# Patient Record
Sex: MALE | Race: WHITE | NOT HISPANIC OR LATINO | Employment: UNEMPLOYED | URBAN - METROPOLITAN AREA
[De-identification: names, ages, dates, MRNs, and addresses within clinical notes are randomized per-mention and may not be internally consistent; named-entity substitution may affect disease eponyms.]

---

## 2024-04-15 ENCOUNTER — HOSPITAL ENCOUNTER (OUTPATIENT)
Facility: HOSPITAL | Age: 69
Discharge: HOME | End: 2024-04-16
Attending: EMERGENCY MEDICINE | Admitting: INTERNAL MEDICINE
Payer: MEDICARE

## 2024-04-15 DIAGNOSIS — R07.9 CHEST PAIN: ICD-10-CM

## 2024-04-15 DIAGNOSIS — N17.9 AKI (ACUTE KIDNEY INJURY): ICD-10-CM

## 2024-04-15 DIAGNOSIS — R33.9 URINARY RETENTION: Primary | ICD-10-CM

## 2024-04-15 DIAGNOSIS — Z90.5 H/O RIGHT NEPHRECTOMY: ICD-10-CM

## 2024-04-15 PROBLEM — I10 ESSENTIAL HYPERTENSION: Status: ACTIVE | Noted: 2024-04-15

## 2024-04-15 PROBLEM — R73.9 HYPERGLYCEMIA: Status: ACTIVE | Noted: 2024-04-15

## 2024-04-15 LAB
ALBUMIN SERPL BCP-MCNC: 4.3 G/DL (ref 3.5–5.2)
ALP SERPL-CCNC: 142 U/L (ref 55–135)
ALT SERPL W/O P-5'-P-CCNC: 13 U/L (ref 10–44)
ANION GAP SERPL CALC-SCNC: 8 MMOL/L (ref 8–16)
AST SERPL-CCNC: 11 U/L (ref 10–40)
BACTERIA #/AREA URNS HPF: ABNORMAL /HPF
BASOPHILS # BLD AUTO: 0.03 K/UL (ref 0–0.2)
BASOPHILS NFR BLD: 0.3 % (ref 0–1.9)
BILIRUB SERPL-MCNC: 0.5 MG/DL (ref 0.1–1)
BILIRUB UR QL STRIP: NEGATIVE
BUN SERPL-MCNC: 23 MG/DL (ref 8–23)
CALCIUM SERPL-MCNC: 9.9 MG/DL (ref 8.7–10.5)
CHLORIDE SERPL-SCNC: 104 MMOL/L (ref 95–110)
CLARITY UR: CLEAR
CO2 SERPL-SCNC: 25 MMOL/L (ref 23–29)
COLOR UR: YELLOW
CREAT SERPL-MCNC: 2.4 MG/DL (ref 0.5–1.4)
DIFFERENTIAL METHOD BLD: ABNORMAL
EOSINOPHIL # BLD AUTO: 0 K/UL (ref 0–0.5)
EOSINOPHIL NFR BLD: 0.2 % (ref 0–8)
ERYTHROCYTE [DISTWIDTH] IN BLOOD BY AUTOMATED COUNT: 13.5 % (ref 11.5–14.5)
EST. GFR  (NO RACE VARIABLE): 28.1 ML/MIN/1.73 M^2
GLUCOSE SERPL-MCNC: 242 MG/DL (ref 70–110)
GLUCOSE SERPL-MCNC: 324 MG/DL (ref 70–110)
GLUCOSE UR QL STRIP: ABNORMAL
HCT VFR BLD AUTO: 46.8 % (ref 40–54)
HGB BLD-MCNC: 15.2 G/DL (ref 14–18)
HGB UR QL STRIP: ABNORMAL
IMM GRANULOCYTES # BLD AUTO: 0.03 K/UL (ref 0–0.04)
IMM GRANULOCYTES NFR BLD AUTO: 0.3 % (ref 0–0.5)
KETONES UR QL STRIP: NEGATIVE
LEUKOCYTE ESTERASE UR QL STRIP: NEGATIVE
LIPASE SERPL-CCNC: 18 U/L (ref 4–60)
LYMPHOCYTES # BLD AUTO: 0.8 K/UL (ref 1–4.8)
LYMPHOCYTES NFR BLD: 8.6 % (ref 18–48)
MCH RBC QN AUTO: 27.4 PG (ref 27–31)
MCHC RBC AUTO-ENTMCNC: 32.5 G/DL (ref 32–36)
MCV RBC AUTO: 84 FL (ref 82–98)
MICROSCOPIC COMMENT: ABNORMAL
MONOCYTES # BLD AUTO: 0.6 K/UL (ref 0.3–1)
MONOCYTES NFR BLD: 6.2 % (ref 4–15)
NEUTROPHILS # BLD AUTO: 8 K/UL (ref 1.8–7.7)
NEUTROPHILS NFR BLD: 84.4 % (ref 38–73)
NITRITE UR QL STRIP: NEGATIVE
NON-SQ EPI CELLS #/AREA URNS HPF: 3 /HPF
NRBC BLD-RTO: 0 /100 WBC
PH UR STRIP: 6 [PH] (ref 5–8)
PLATELET # BLD AUTO: 211 K/UL (ref 150–450)
PMV BLD AUTO: 10.3 FL (ref 9.2–12.9)
POTASSIUM SERPL-SCNC: 5 MMOL/L (ref 3.5–5.1)
PROT SERPL-MCNC: 7.8 G/DL (ref 6–8.4)
PROT UR QL STRIP: NEGATIVE
RBC # BLD AUTO: 5.55 M/UL (ref 4.6–6.2)
RBC #/AREA URNS HPF: 50 /HPF (ref 0–4)
SODIUM SERPL-SCNC: 137 MMOL/L (ref 136–145)
SP GR UR STRIP: 1.02 (ref 1–1.03)
URN SPEC COLLECT METH UR: ABNORMAL
UROBILINOGEN UR STRIP-ACNC: NEGATIVE EU/DL
WBC # BLD AUTO: 9.43 K/UL (ref 3.9–12.7)
YEAST URNS QL MICRO: ABNORMAL

## 2024-04-15 PROCEDURE — 81001 URINALYSIS AUTO W/SCOPE: CPT | Performed by: EMERGENCY MEDICINE

## 2024-04-15 PROCEDURE — G0378 HOSPITAL OBSERVATION PER HR: HCPCS

## 2024-04-15 PROCEDURE — 96360 HYDRATION IV INFUSION INIT: CPT | Mod: 59

## 2024-04-15 PROCEDURE — 96361 HYDRATE IV INFUSION ADD-ON: CPT | Mod: 59

## 2024-04-15 PROCEDURE — 25000003 PHARM REV CODE 250: Performed by: NURSE PRACTITIONER

## 2024-04-15 PROCEDURE — 83690 ASSAY OF LIPASE: CPT | Performed by: EMERGENCY MEDICINE

## 2024-04-15 PROCEDURE — 96361 HYDRATE IV INFUSION ADD-ON: CPT

## 2024-04-15 PROCEDURE — 51702 INSERT TEMP BLADDER CATH: CPT

## 2024-04-15 PROCEDURE — 82962 GLUCOSE BLOOD TEST: CPT

## 2024-04-15 PROCEDURE — 51798 US URINE CAPACITY MEASURE: CPT | Mod: 59

## 2024-04-15 PROCEDURE — 80053 COMPREHEN METABOLIC PANEL: CPT | Performed by: EMERGENCY MEDICINE

## 2024-04-15 PROCEDURE — 85025 COMPLETE CBC W/AUTO DIFF WBC: CPT | Performed by: EMERGENCY MEDICINE

## 2024-04-15 PROCEDURE — 99285 EMERGENCY DEPT VISIT HI MDM: CPT | Mod: 25

## 2024-04-15 RX ORDER — GLUCAGON 1 MG
1 KIT INJECTION
Status: DISCONTINUED | OUTPATIENT
Start: 2024-04-15 | End: 2024-04-15

## 2024-04-15 RX ORDER — ACETAMINOPHEN 325 MG/1
650 TABLET ORAL EVERY 4 HOURS PRN
Status: DISCONTINUED | OUTPATIENT
Start: 2024-04-15 | End: 2024-04-16 | Stop reason: HOSPADM

## 2024-04-15 RX ORDER — IBUPROFEN 200 MG
16 TABLET ORAL
Status: DISCONTINUED | OUTPATIENT
Start: 2024-04-15 | End: 2024-04-15

## 2024-04-15 RX ORDER — SODIUM CHLORIDE 9 MG/ML
INJECTION, SOLUTION INTRAVENOUS CONTINUOUS
Status: DISCONTINUED | OUTPATIENT
Start: 2024-04-15 | End: 2024-04-16 | Stop reason: HOSPADM

## 2024-04-15 RX ORDER — TAMSULOSIN HYDROCHLORIDE 0.4 MG/1
0.4 CAPSULE ORAL DAILY
Status: DISCONTINUED | OUTPATIENT
Start: 2024-04-15 | End: 2024-04-16 | Stop reason: HOSPADM

## 2024-04-15 RX ORDER — TALC
6 POWDER (GRAM) TOPICAL NIGHTLY PRN
Status: DISCONTINUED | OUTPATIENT
Start: 2024-04-15 | End: 2024-04-16 | Stop reason: HOSPADM

## 2024-04-15 RX ORDER — SODIUM,POTASSIUM PHOSPHATES 280-250MG
2 POWDER IN PACKET (EA) ORAL
Status: DISCONTINUED | OUTPATIENT
Start: 2024-04-15 | End: 2024-04-16 | Stop reason: HOSPADM

## 2024-04-15 RX ORDER — ALUMINUM HYDROXIDE, MAGNESIUM HYDROXIDE, AND SIMETHICONE 1200; 120; 1200 MG/30ML; MG/30ML; MG/30ML
30 SUSPENSION ORAL 4 TIMES DAILY PRN
Status: DISCONTINUED | OUTPATIENT
Start: 2024-04-15 | End: 2024-04-16 | Stop reason: HOSPADM

## 2024-04-15 RX ORDER — NALOXONE HCL 0.4 MG/ML
0.02 VIAL (ML) INJECTION
Status: DISCONTINUED | OUTPATIENT
Start: 2024-04-15 | End: 2024-04-16 | Stop reason: HOSPADM

## 2024-04-15 RX ORDER — LISINOPRIL 20 MG/1
20 TABLET ORAL DAILY
COMMUNITY

## 2024-04-15 RX ORDER — ACETAMINOPHEN 500 MG
500 TABLET ORAL EVERY 6 HOURS PRN
COMMUNITY

## 2024-04-15 RX ORDER — ONDANSETRON HYDROCHLORIDE 2 MG/ML
4 INJECTION, SOLUTION INTRAVENOUS EVERY 6 HOURS PRN
Status: DISCONTINUED | OUTPATIENT
Start: 2024-04-15 | End: 2024-04-16 | Stop reason: HOSPADM

## 2024-04-15 RX ORDER — AMOXICILLIN 250 MG
1 CAPSULE ORAL DAILY
Status: DISCONTINUED | OUTPATIENT
Start: 2024-04-16 | End: 2024-04-16 | Stop reason: HOSPADM

## 2024-04-15 RX ORDER — ACETAMINOPHEN 325 MG/1
650 TABLET ORAL EVERY 8 HOURS PRN
Status: DISCONTINUED | OUTPATIENT
Start: 2024-04-15 | End: 2024-04-16 | Stop reason: HOSPADM

## 2024-04-15 RX ORDER — INSULIN ASPART 100 [IU]/ML
0-5 INJECTION, SOLUTION INTRAVENOUS; SUBCUTANEOUS
Status: DISCONTINUED | OUTPATIENT
Start: 2024-04-15 | End: 2024-04-15

## 2024-04-15 RX ORDER — LISINOPRIL 20 MG/1
20 TABLET ORAL DAILY
Status: CANCELLED | OUTPATIENT
Start: 2024-04-16

## 2024-04-15 RX ORDER — LANOLIN ALCOHOL/MO/W.PET/CERES
800 CREAM (GRAM) TOPICAL
Status: DISCONTINUED | OUTPATIENT
Start: 2024-04-15 | End: 2024-04-16 | Stop reason: HOSPADM

## 2024-04-15 RX ORDER — HYDROCODONE BITARTRATE AND ACETAMINOPHEN 5; 325 MG/1; MG/1
1 TABLET ORAL EVERY 6 HOURS PRN
Status: DISCONTINUED | OUTPATIENT
Start: 2024-04-15 | End: 2024-04-16 | Stop reason: HOSPADM

## 2024-04-15 RX ORDER — IBUPROFEN 200 MG
24 TABLET ORAL
Status: DISCONTINUED | OUTPATIENT
Start: 2024-04-15 | End: 2024-04-15

## 2024-04-15 RX ORDER — SODIUM CHLORIDE 0.9 % (FLUSH) 0.9 %
2 SYRINGE (ML) INJECTION EVERY 12 HOURS PRN
Status: DISCONTINUED | OUTPATIENT
Start: 2024-04-15 | End: 2024-04-16 | Stop reason: HOSPADM

## 2024-04-15 RX ORDER — PANTOPRAZOLE SODIUM 40 MG/1
40 TABLET, DELAYED RELEASE ORAL DAILY
Status: DISCONTINUED | OUTPATIENT
Start: 2024-04-16 | End: 2024-04-16 | Stop reason: HOSPADM

## 2024-04-15 RX ADMIN — SODIUM CHLORIDE: 9 INJECTION, SOLUTION INTRAVENOUS at 04:04

## 2024-04-15 RX ADMIN — TAMSULOSIN HYDROCHLORIDE 0.4 MG: 0.4 CAPSULE ORAL at 04:04

## 2024-04-15 NOTE — ED NOTES
Bed: [place] [address]  Expected date:   Expected time:   Means of arrival:   Comments:  CC EXAM

## 2024-04-15 NOTE — ASSESSMENT & PLAN NOTE
"Patient's FSGs are uncontrolled due to hyperglycemia on current medication regimen.  Last A1c reviewed- No results found for: "LABA1C", "HGBA1C"  Most recent fingerstick glucose reviewed- No results for input(s): "POCTGLUCOSE" in the last 24 hours.  Current correctional scale  Low  Maintain anti-hyperglycemic dose as follows-   Antihyperglycemics (From admission, onward)      Start     Stop Route Frequency Ordered    04/15/24 1630  insulin aspart U-100 pen 0-5 Units         -- SubQ Before meals & nightly PRN 04/15/24 1531          Work up for new onset diabetes.   Accu checks, SSI, check Hgb A1c  Diabetic education      "

## 2024-04-15 NOTE — ED PROVIDER NOTES
Encounter Date: 4/15/2024       History     Chief Complaint   Patient presents with    Urinary Retention     Since last night     69-year-old male presents to the emergency department secondary to urinary retention that started a proximally 7 p.m..  Patient has a past medical history of right nephrectomy secondary to renal cell carcinoma, hypertension.  Denies history of prostate problems, has had no recent fevers, or dysuria.  Patient currently has no complaints        Review of patient's allergies indicates:   Allergen Reactions    Mercurial analogues Rash     Past Medical History:   Diagnosis Date    Cancer     Chronic kidney disease     Hypertension      Past Surgical History:   Procedure Laterality Date    CHOLECYSTECTOMY      NEPHRECTOMY      right     Family History   Problem Relation Name Age of Onset    Cancer Mother      Diabetes Mother      Diabetes Father       Social History     Tobacco Use    Smoking status: Every Day     Types: Cigarettes     Review of Systems   Constitutional:  Negative for fever.   HENT: Negative.     Respiratory: Negative.     Cardiovascular: Negative.    Genitourinary:  Positive for difficulty urinating. Negative for urgency.   Musculoskeletal: Negative.    Hematological: Negative.    Psychiatric/Behavioral: Negative.     All other systems reviewed and are negative.      Physical Exam     Initial Vitals [04/15/24 1026]   BP Pulse Resp Temp SpO2   (!) 187/102 100 18 98 °F (36.7 °C) 97 %      MAP       --         Physical Exam    Nursing note and vitals reviewed.  Constitutional: He appears well-developed and well-nourished.   HENT:   Head: Normocephalic.   Eyes: EOM are normal. Pupils are equal, round, and reactive to light.   Neck: Neck supple.   Normal range of motion.  Cardiovascular:  Normal rate and regular rhythm.           Pulmonary/Chest: Breath sounds normal. No respiratory distress.   Abdominal: Abdomen is soft. Bowel sounds are normal.   Musculoskeletal:      Cervical  back: Normal range of motion and neck supple.     Neurological: He is alert. No sensory deficit. GCS score is 15. GCS eye subscore is 4. GCS verbal subscore is 5. GCS motor subscore is 6.   Skin: Skin is warm and dry.   Psychiatric: He has a normal mood and affect.         ED Course   Procedures  Labs Reviewed   CBC W/ AUTO DIFFERENTIAL - Abnormal; Notable for the following components:       Result Value    Gran # (ANC) 8.0 (*)     Lymph # 0.8 (*)     Gran % 84.4 (*)     Lymph % 8.6 (*)     All other components within normal limits   COMPREHENSIVE METABOLIC PANEL - Abnormal; Notable for the following components:    Glucose 324 (*)     Creatinine 2.4 (*)     Alkaline Phosphatase 142 (*)     eGFR 28.1 (*)     All other components within normal limits   URINALYSIS, REFLEX TO URINE CULTURE - Abnormal; Notable for the following components:    Glucose, UA 4+ (*)     Occult Blood UA 1+ (*)     All other components within normal limits    Narrative:     Specimen Source->Urine   URINALYSIS MICROSCOPIC - Abnormal; Notable for the following components:    RBC, UA 50 (*)     Non-Squam Epith 3 (*)     All other components within normal limits    Narrative:     Specimen Source->Urine   LIPASE          Imaging Results              US Retroperitoneal Complete (Final result)  Result time 04/15/24 14:15:46      Final result by Ryan Ly MD (04/15/24 14:15:46)                   Impression:      1. No findings of medical or surgical renal disease on the left.  2. Simple left renal cyst.  3. Previous right nephrectomy.      Electronically signed by: Ryan Ly  Date:    04/15/2024  Time:    14:15               Narrative:    EXAMINATION:  US RETROPERITONEAL COMPLETE    CLINICAL HISTORY:  flank pain h/o prev nephrectomy;    FINDINGS:  Comparison to prior ultrasound of 07/11/2013. The right kidney is surgically absent, with no mass or fluid collection in the right renal fossa.    The left kidney measures 12.7 cm in length and has  normal cortical thickness and parenchymal echotexture, without echogenic calculi or hydronephrosis.  A 38 mm oval circumscribed anechoic simple cyst arising from the midportion has increased through transmission of sound.    The urinary bladder is collapsed and not well evaluated.  The visualized abdominal aorta, aortic bifurcation, common iliac artery origins, and IVC are normal.                                       Medications - No data to display  Medical Decision Making  69-year-old male presents to the emergency department secondary to urinary retention that started a proximally 7 p.m..  Patient has a past medical history of right nephrectomy secondary to renal cell carcinoma, hypertension.  Denies history of prostate problems, has had no recent fevers, or dysuria.  Patient currently has no complaints      Considerations include but not limited to UTI, urinary retention, electrolyte abnormalities, renal abnormalities    69-year-old male presents to the emergency department secondary to urinary tension that started a proximally 7 p.m. last night patient denies any previous medical history although he has had a previous right nephrectomy secondary to renal cell carcinoma he states that he has had no issues he is followed by Dr. Jensen.  Denies any back pain fever nausea.  Patient has no evidence of leukocytosis, no evidence of infection on urinalysis.  He does have JAYLON with creatinine 2.4 GFR 24 I am unable to see if this is new or not because I do not have any old labs in the patient's chart.  Upon arrival bladder scan revealed greater than 900 cc of urine a catheter was placed with a 1000 cc of clear urine.  Retroperitoneal ultrasound performed no significant abnormalities noted to left kidney  Patient will be admitted secondary to medical history with JAYLON.    Amount and/or Complexity of Data Reviewed  Independent Historian: parent and spouse  External Data Reviewed: labs, radiology and notes.  Labs:  ordered. Decision-making details documented in ED Course.  Radiology: ordered.    Risk  Decision regarding hospitalization.              Attending Attestation:     Physician Attestation Statement for NP/PA:       Other NP/PA Attestation Additions:    History of Present Illness: I performed face-to-face evaluation due to medical complexity.  Patient reports inability to urinate.  History of nephrectomy.   Physical Exam: Patient is alert, no distress.  I did evaluated patient after Sharma catheter.   Medical Decision Making: Patient's significant other report normal renal function.  Unfortunately have no old labs for comparison.  Today creatinine is 2.4.  Patient has solitary kidney.  Patient does have urinary retention is likely etiology.  Patient to be admitted for further evaluation.  Ultrasound pending at time of dictation.  Carina with [facility] to evaluate.                                    Clinical Impression:  Final diagnoses:  [R33.9] Urinary retention (Primary)  [N17.9] JAYLON (acute kidney injury)  [Z90.5] H/O right nephrectomy          ED Disposition Condition    Admit Stable                Juany Brumfield FNP  04/15/24 1432       Ron Castellanos MD  04/15/24 6421

## 2024-04-15 NOTE — ASSESSMENT & PLAN NOTE
Patient with acute kidney injury/acute renal failure likely due to pre-renal azotemia due to dehydration JAYLON is currently  unknown due to unknown  baseline creatinine  - Labs reviewed- Renal function/electrolytes with Estimated Creatinine Clearance: 30.1 mL/min (A) (based on SCr of 2.4 mg/dL (H)). according to latest data. Monitor urine output and serial BMP and adjust therapy as needed. Avoid nephrotoxins and renally dose meds for GFR listed above.

## 2024-04-15 NOTE — HPI
69-year-old male with a past medical history of chronic kidney disease, hypertension, renal cell carcinoma status post right nephrectomy presents to the emergency room reports of urinary retention that started at 7:00 p.m. last night.  He denies prostate problems.  He denies dysuria frequency.  Denies chest pain shortness for breath fevers weakness. he is followed by Dr. Jensen.  In the ER afebrile, no leukocytosis urinalysis negative for infection.  He does have JAYLON with a serum creatinine of 2.4 GFR of 24.  No old records to review baseline GFR.  Bladder scan revealed 900 mL of urine Sharma catheter placed 1000 mL of urine removed from bladder.  Retroperitoneal ultrasound performed no significant abnormalities noted to left kidney. Admit to  hospital medicine due to medical history with JAYLON.

## 2024-04-15 NOTE — ASSESSMENT & PLAN NOTE
Continue ramon x3 to 5 days total.   The patient was was not offered the option of performing clean intermittent catheterization.   Start flomax daily at 0.4 mg  Follow up with Urology outpatient.

## 2024-04-15 NOTE — SUBJECTIVE & OBJECTIVE
Past Medical History:   Diagnosis Date    Cancer     Chronic kidney disease     Hypertension        Past Surgical History:   Procedure Laterality Date    CHOLECYSTECTOMY      NEPHRECTOMY      right       Review of patient's allergies indicates:   Allergen Reactions    Mercurial analogues Rash       Current Facility-Administered Medications   Medication Dose Route Frequency Provider Last Rate Last Admin    0.9%  NaCl infusion   Intravenous Continuous Miknaitis, Carina, NP        acetaminophen tablet 650 mg  650 mg Oral Q8H PRN Miknaitis, Carina, NP        acetaminophen tablet 650 mg  650 mg Oral Q4H PRN Miknaitis, Carina, NP        aluminum-magnesium hydroxide-simethicone 200-200-20 mg/5 mL suspension 30 mL  30 mL Oral QID PRN Miknaitis, Carina, NP        dextrose 50% injection 12.5 g  12.5 g Intravenous PRN Miknaitis, Carina, NP        dextrose 50% injection 25 g  25 g Intravenous PRN Miknaitis, Carina, NP        glucagon (human recombinant) injection 1 mg  1 mg Intramuscular PRN Miknaitis, Carina, NP        glucose chewable tablet 16 g  16 g Oral PRN Miknaitis, Carina, NP        glucose chewable tablet 24 g  24 g Oral PRN Miknaitis, Carina, NP        HYDROcodone-acetaminophen 5-325 mg per tablet 1 tablet  1 tablet Oral Q6H PRN Mikvictor mtis, Carina, NP        insulin aspart U-100 pen 0-5 Units  0-5 Units Subcutaneous QID (AC + HS) PRN Mikbj Carina, NP        melatonin tablet 6 mg  6 mg Oral Nightly PRN Rebeca Carina, NP        naloxone 0.4 mg/mL injection 0.02 mg  0.02 mg Intravenous PRN Mikvictor mtis, Carina, NP        ondansetron injection 4 mg  4 mg Intravenous Q6H PRN Mikvictor mtis Carina, NP        [START ON 4/16/2024] senna-docusate 8.6-50 mg per tablet 1 tablet  1 tablet Oral Daily Miknaitis, Carina, NP        sodium chloride 0.9% flush 2 mL  2 mL Intravenous Q12H PRN Mikvictor mtis, Carina, NP        tamsulosin 24 hr capsule 0.4 mg  0.4 mg Oral Daily Miknaitis, Carina, NP         Current Outpatient Medications   Medication Sig Dispense Refill     acetaminophen (TYLENOL EXTRA STRENGTH) 500 MG tablet Take 500 mg by mouth every 6 (six) hours as needed for Pain.      lisinopriL (PRINIVIL,ZESTRIL) 20 MG tablet Take 20 mg by mouth once daily.       Family History       Problem Relation (Age of Onset)    Cancer Mother    Diabetes Mother, Father          Tobacco Use    Smoking status: Every Day     Types: Cigarettes    Smokeless tobacco: Not on file   Substance and Sexual Activity    Alcohol use: Not on file    Drug use: Not on file    Sexual activity: Not on file     Review of Systems   Constitutional: Negative.    HENT: Negative.     Respiratory: Negative.     Cardiovascular: Negative.    Gastrointestinal: Negative.    Genitourinary:  Positive for difficulty urinating.        Unable to empty bladder.   Neurological: Negative.    Psychiatric/Behavioral: Negative.     All other systems reviewed and are negative.    Objective:     Vital Signs (Most Recent):  Temp: 98 °F (36.7 °C) (04/15/24 1026)  Pulse: 80 (04/15/24 1444)  Resp: 18 (04/15/24 1444)  BP: (!) 140/72 (04/15/24 1444)  SpO2: 99 % (04/15/24 1444) Vital Signs (24h Range):  Temp:  [98 °F (36.7 °C)] 98 °F (36.7 °C)  Pulse:  [] 80  Resp:  [18] 18  SpO2:  [97 %-99 %] 99 %  BP: (140-187)/() 140/72     Weight: 83.9 kg (185 lb)  Body mass index is 25.8 kg/m².     Physical Exam  Vitals reviewed.   Constitutional:       Appearance: Normal appearance.   HENT:      Head: Normocephalic and atraumatic.      Mouth/Throat:      Mouth: Mucous membranes are dry.   Eyes:      Extraocular Movements: Extraocular movements intact.      Pupils: Pupils are equal, round, and reactive to light.   Cardiovascular:      Rate and Rhythm: Normal rate and regular rhythm.      Pulses: Normal pulses.      Heart sounds: Normal heart sounds. No murmur heard.     No gallop.   Pulmonary:      Effort: Pulmonary effort is normal. No respiratory distress.      Breath sounds: Normal breath sounds. No wheezing.   Abdominal:      General:  There is no distension.      Palpations: Abdomen is soft.      Tenderness: There is no abdominal tenderness.   Genitourinary:     Comments: Sharma catheter placed  Musculoskeletal:         General: Normal range of motion.      Cervical back: Normal range of motion.   Skin:     General: Skin is warm and dry.      Capillary Refill: Capillary refill takes less than 2 seconds.   Neurological:      General: No focal deficit present.      Mental Status: He is alert and oriented to person, place, and time. Mental status is at baseline.      Cranial Nerves: No cranial nerve deficit.   Psychiatric:         Mood and Affect: Mood normal.              CRANIAL NERVES     CN III, IV, VI   Pupils are equal, round, and reactive to light.       Significant Labs: All pertinent labs within the past 24 hours have been reviewed.  Recent Lab Results         04/15/24  1147   04/15/24  1110        Albumin   4.3       ALP   142       ALT   13       Anion Gap   8       Appearance, UA Clear         AST   11       Bacteria, UA Rare         Baso #   0.03       Basophil %   0.3       Bilirubin (UA) Negative         BILIRUBIN TOTAL   0.5  Comment: For infants and newborns, interpretation of results should be based  on gestational age, weight and in agreement with clinical  observations.    Premature Infant recommended reference ranges:  Up to 24 hours.............<8.0 mg/dL  Up to 48 hours............<12.0 mg/dL  3-5 days..................<15.0 mg/dL  6-29 days.................<15.0 mg/dL         BUN   23       Calcium   9.9       Chloride   104       CO2   25       Color, UA Yellow         Creatinine   2.4       Differential Method   Automated       eGFR   28.1       Eos #   0.0       Eos %   0.2       Glucose   324       Glucose, UA 4+         Gran # (ANC)   8.0       Gran %   84.4       Hematocrit   46.8       Hemoglobin   15.2       Immature Grans (Abs)   0.03  Comment: Mild elevation in immature granulocytes is non specific and   can be seen  in a variety of conditions including stress response,   acute inflammation, trauma and pregnancy. Correlation with other   laboratory and clinical findings is essential.         Immature Granulocytes   0.3       Ketones, UA Negative         Leukocyte Esterase, UA Negative         Lipase   18       Lymph #   0.8       Lymph %   8.6       MCH   27.4       MCHC   32.5       MCV   84       Microscopic Comment SEE COMMENT  Comment: Other formed elements not mentioned in the report are not   present in the microscopic examination.            Mono #   0.6       Mono %   6.2       MPV   10.3       NITRITE UA Negative         NON-SQUAM EPITH 3         nRBC   0       Blood, UA 1+         pH, UA 6.0         Platelet Count   211       Potassium   5.0       PROTEIN TOTAL   7.8       Protein, UA Negative  Comment: Recommend a 24 hour urine protein or a urine   protein/creatinine ratio if globulin induced proteinuria is  clinically suspected.           RBC   5.55       RBC, UA 50         RDW   13.5       Sodium   137       Specific [place], UA 1.025         Specimen UA Urine, Clean Catch         UROBILINOGEN UA Negative         WBC   9.43       Yeast, UA None                 Significant Imaging: I have reviewed all pertinent imaging results/findings within the past 24 hours.  Imaging Results              US Retroperitoneal Complete (Final result)  Result time 04/15/24 14:15:46      Final result by Ryan Ly MD (04/15/24 14:15:46)                   Impression:      1. No findings of medical or surgical renal disease on the left.  2. Simple left renal cyst.  3. Previous right nephrectomy.      Electronically signed by: Ryan Ly  Date:    04/15/2024  Time:    14:15               Narrative:    EXAMINATION:  US RETROPERITONEAL COMPLETE    CLINICAL HISTORY:  flank pain h/o prev nephrectomy;    FINDINGS:  Comparison to prior ultrasound of 07/11/2013. The right kidney is surgically absent, with no mass or fluid collection in the  right renal fossa.    The left kidney measures 12.7 cm in length and has normal cortical thickness and parenchymal echotexture, without echogenic calculi or hydronephrosis.  A 38 mm oval circumscribed anechoic simple cyst arising from the midportion has increased through transmission of sound.    The urinary bladder is collapsed and not well evaluated.  The visualized abdominal aorta, aortic bifurcation, common iliac artery origins, and IVC are normal.

## 2024-04-15 NOTE — H&P
[facility] - Emergency Dept  Hospital Medicine  History & Physical    Patient Name: Jack Kenyon  MRN: [id number]  Patient Class: OP- Observation  Admission Date: 4/15/2024  Attending Physician: Melchor Topete MD   Primary Care Provider: Rafi Wolfe MD         Patient information was obtained from patient and ER records.     Subjective:     Principal Problem:JAYLON (acute kidney injury)    Chief Complaint:   Chief Complaint   Patient presents with    Urinary Retention     Since last night        HPI: 69-year-old male with a past medical history of chronic kidney disease, hypertension, renal cell carcinoma status post right nephrectomy presents to the emergency room reports of urinary retention that started at 7:00 p.m. last night.  He denies prostate problems.  He denies dysuria frequency.  Denies chest pain shortness for breath fevers weakness. he is followed by Dr. Jensen.  In the ER afebrile, no leukocytosis urinalysis negative for infection.  He does have JAYLON with a serum creatinine of 2.4 GFR of 24.  No old records to review baseline GFR.  Bladder scan revealed 900 mL of urine Sharma catheter placed 1000 mL of urine removed from bladder.  Retroperitoneal ultrasound performed no significant abnormalities noted to left kidney. Admit to  hospital medicine due to medical history with JAYLON.       Past Medical History:   Diagnosis Date    Cancer     Chronic kidney disease     Hypertension        Past Surgical History:   Procedure Laterality Date    CHOLECYSTECTOMY      NEPHRECTOMY      right       Review of patient's allergies indicates:   Allergen Reactions    Mercurial analogues Rash       Current Facility-Administered Medications   Medication Dose Route Frequency Provider Last Rate Last Admin    0.9%  NaCl infusion   Intravenous Continuous Carina Jose NP        acetaminophen tablet 650 mg  650 mg Oral Q8H PRN Carina Jose NP        acetaminophen tablet 650 mg  650 mg Oral Q4H PRN  Carina Jose, NP        aluminum-magnesium hydroxide-simethicone 200-200-20 mg/5 mL suspension 30 mL  30 mL Oral QID PRN Carina Jose, NP        dextrose 50% injection 12.5 g  12.5 g Intravenous PRN Carina Jose, NP        dextrose 50% injection 25 g  25 g Intravenous PRN Carina Jose, NP        glucagon (human recombinant) injection 1 mg  1 mg Intramuscular PRN Carina Jose, NP        glucose chewable tablet 16 g  16 g Oral PRN Mirella Josei, NP        glucose chewable tablet 24 g  24 g Oral PRN Mirella Josei, NP        HYDROcodone-acetaminophen 5-325 mg per tablet 1 tablet  1 tablet Oral Q6H PRN Carina Jose, MAHSA        insulin aspart U-100 pen 0-5 Units  0-5 Units Subcutaneous QID (AC + HS) PRN Carina Jose, NP        melatonin tablet 6 mg  6 mg Oral Nightly PRN Carina Jose, NP        naloxone 0.4 mg/mL injection 0.02 mg  0.02 mg Intravenous PRN Carina Jose, MAHSA        ondansetron injection 4 mg  4 mg Intravenous Q6H PRN Carina Jose, MAHSA        [START ON 4/16/2024] senna-docusate 8.6-50 mg per tablet 1 tablet  1 tablet Oral Daily Carina Jose, NP        sodium chloride 0.9% flush 2 mL  2 mL Intravenous Q12H PRN Carina Jose, MAHSA        tamsulosin 24 hr capsule 0.4 mg  0.4 mg Oral Daily Carina Jose NP         Current Outpatient Medications   Medication Sig Dispense Refill    acetaminophen (TYLENOL EXTRA STRENGTH) 500 MG tablet Take 500 mg by mouth every 6 (six) hours as needed for Pain.      lisinopriL (PRINIVIL,ZESTRIL) 20 MG tablet Take 20 mg by mouth once daily.       Family History       Problem Relation (Age of Onset)    Cancer Mother    Diabetes Mother, Father          Tobacco Use    Smoking status: Every Day     Types: Cigarettes    Smokeless tobacco: Not on file   Substance and Sexual Activity    Alcohol use: Not on file    Drug use: Not on file    Sexual activity: Not on file     Review of Systems   Constitutional: Negative.    HENT: Negative.     Respiratory:  Negative.     Cardiovascular: Negative.    Gastrointestinal: Negative.    Genitourinary:  Positive for difficulty urinating.        Unable to empty bladder.   Neurological: Negative.    Psychiatric/Behavioral: Negative.     All other systems reviewed and are negative.    Objective:     Vital Signs (Most Recent):  Temp: 98 °F (36.7 °C) (04/15/24 1026)  Pulse: 80 (04/15/24 1444)  Resp: 18 (04/15/24 1444)  BP: (!) 140/72 (04/15/24 1444)  SpO2: 99 % (04/15/24 1444) Vital Signs (24h Range):  Temp:  [98 °F (36.7 °C)] 98 °F (36.7 °C)  Pulse:  [] 80  Resp:  [18] 18  SpO2:  [97 %-99 %] 99 %  BP: (140-187)/() 140/72     Weight: 83.9 kg (185 lb)  Body mass index is 25.8 kg/m².     Physical Exam  Vitals reviewed.   Constitutional:       Appearance: Normal appearance.   HENT:      Head: Normocephalic and atraumatic.      Mouth/Throat:      Mouth: Mucous membranes are dry.   Eyes:      Extraocular Movements: Extraocular movements intact.      Pupils: Pupils are equal, round, and reactive to light.   Cardiovascular:      Rate and Rhythm: Normal rate and regular rhythm.      Pulses: Normal pulses.      Heart sounds: Normal heart sounds. No murmur heard.     No gallop.   Pulmonary:      Effort: Pulmonary effort is normal. No respiratory distress.      Breath sounds: Normal breath sounds. No wheezing.   Abdominal:      General: There is no distension.      Palpations: Abdomen is soft.      Tenderness: There is no abdominal tenderness.   Genitourinary:     Comments: Sharma catheter placed  Musculoskeletal:         General: Normal range of motion.      Cervical back: Normal range of motion.   Skin:     General: Skin is warm and dry.      Capillary Refill: Capillary refill takes less than 2 seconds.   Neurological:      General: No focal deficit present.      Mental Status: He is alert and oriented to person, place, and time. Mental status is at baseline.      Cranial Nerves: No cranial nerve deficit.   Psychiatric:          Mood and Affect: Mood normal.              CRANIAL NERVES     CN III, IV, VI   Pupils are equal, round, and reactive to light.       Significant Labs: All pertinent labs within the past 24 hours have been reviewed.  Recent Lab Results         04/15/24  1147   04/15/24  1110        Albumin   4.3       ALP   142       ALT   13       Anion Gap   8       Appearance, UA Clear         AST   11       Bacteria, UA Rare         Baso #   0.03       Basophil %   0.3       Bilirubin (UA) Negative         BILIRUBIN TOTAL   0.5  Comment: For infants and newborns, interpretation of results should be based  on gestational age, weight and in agreement with clinical  observations.    Premature Infant recommended reference ranges:  Up to 24 hours.............<8.0 mg/dL  Up to 48 hours............<12.0 mg/dL  3-5 days..................<15.0 mg/dL  6-29 days.................<15.0 mg/dL         BUN   23       Calcium   9.9       Chloride   104       CO2   25       Color, UA Yellow         Creatinine   2.4       Differential Method   Automated       eGFR   28.1       Eos #   0.0       Eos %   0.2       Glucose   324       Glucose, UA 4+         Gran # (ANC)   8.0       Gran %   84.4       Hematocrit   46.8       Hemoglobin   15.2       Immature Grans (Abs)   0.03  Comment: Mild elevation in immature granulocytes is non specific and   can be seen in a variety of conditions including stress response,   acute inflammation, trauma and pregnancy. Correlation with other   laboratory and clinical findings is essential.         Immature Granulocytes   0.3       Ketones, UA Negative         Leukocyte Esterase, UA Negative         Lipase   18       Lymph #   0.8       Lymph %   8.6       MCH   27.4       MCHC   32.5       MCV   84       Microscopic Comment SEE COMMENT  Comment: Other formed elements not mentioned in the report are not   present in the microscopic examination.            Mono #   0.6       Mono %   6.2       MPV   10.3       NITRITE  UA Negative         NON-SQUAM EPITH 3         nRBC   0       Blood, UA 1+         pH, UA 6.0         Platelet Count   211       Potassium   5.0       PROTEIN TOTAL   7.8       Protein, UA Negative  Comment: Recommend a 24 hour urine protein or a urine   protein/creatinine ratio if globulin induced proteinuria is  clinically suspected.           RBC   5.55       RBC, UA 50         RDW   13.5       Sodium   137       Specific [place], UA 1.025         Specimen UA Urine, Clean Catch         UROBILINOGEN UA Negative         WBC   9.43       Yeast, UA None                 Significant Imaging: I have reviewed all pertinent imaging results/findings within the past 24 hours.  Imaging Results              US Retroperitoneal Complete (Final result)  Result time 04/15/24 14:15:46      Final result by Ryan Ly MD (04/15/24 14:15:46)                   Impression:      1. No findings of medical or surgical renal disease on the left.  2. Simple left renal cyst.  3. Previous right nephrectomy.      Electronically signed by: Ryan Ly  Date:    04/15/2024  Time:    14:15               Narrative:    EXAMINATION:  US RETROPERITONEAL COMPLETE    CLINICAL HISTORY:  flank pain h/o prev nephrectomy;    FINDINGS:  Comparison to prior ultrasound of 07/11/2013. The right kidney is surgically absent, with no mass or fluid collection in the right renal fossa.    The left kidney measures 12.7 cm in length and has normal cortical thickness and parenchymal echotexture, without echogenic calculi or hydronephrosis.  A 38 mm oval circumscribed anechoic simple cyst arising from the midportion has increased through transmission of sound.    The urinary bladder is collapsed and not well evaluated.  The visualized abdominal aorta, aortic bifurcation, common iliac artery origins, and IVC are normal.                                      Assessment/Plan:     * JAYLON (acute kidney injury)  Patient with acute kidney injury/acute renal failure likely  "due to pre-renal azotemia due to dehydration JAYLON is currently  unknown due to unknown  baseline creatinine  - Labs reviewed- Renal function/electrolytes with Estimated Creatinine Clearance: 30.1 mL/min (A) (based on SCr of 2.4 mg/dL (H)). according to latest data. Monitor urine output and serial BMP and adjust therapy as needed. Avoid nephrotoxins and renally dose meds for GFR listed above.    Urinary retention   Continue ramon x3 to 5 days total.   The patient was was not offered the option of performing clean intermittent catheterization.   Start flomax daily at 0.4 mg  Follow up with Urology outpatient.         Essential hypertension  Chronic, uncontrolled. Latest blood pressure and vitals reviewed-     Temp:  [98 °F (36.7 °C)]   Pulse:  []   Resp:  [18]   BP: (140-187)/()   SpO2:  [97 %-99 %] .   Home meds for hypertension were reviewed and noted below.   Hypertension Medications               lisinopriL (PRINIVIL,ZESTRIL) 20 MG tablet Take 20 mg by mouth once daily.            While in the hospital, will manage blood pressure as follows; Continue home antihypertensive regimen    Will utilize p.r.n. blood pressure medication only if patient's blood pressure greater than 180/110 and he develops symptoms such as worsening chest pain or shortness of breath.    Hyperglycemia  Patient's FSGs are uncontrolled due to hyperglycemia on current medication regimen.  Last A1c reviewed- No results found for: "LABA1C", "HGBA1C"  Most recent fingerstick glucose reviewed- No results for input(s): "POCTGLUCOSE" in the last 24 hours.  Current correctional scale  Low  Maintain anti-hyperglycemic dose as follows-   Antihyperglycemics (From admission, onward)      Start     Stop Route Frequency Ordered    04/15/24 1630  insulin aspart U-100 pen 0-5 Units         -- SubQ Before meals & nightly PRN 04/15/24 1531          Work up for new onset diabetes.   Accu checks, SSI, check Hgb A1c  Diabetic education          VTE Risk " Mitigation (From admission, onward)           Ordered     IP VTE HIGH RISK PATIENT  Once         04/15/24 1531     Place sequential compression device  Until discontinued         04/15/24 1531                         On 04/15/2024, patient should be placed in hospital observation services under my care in collaboration with Dr. Topete.           Carina Jose, NP  Department of Hospital Medicine  [facility] - Emergency Dept

## 2024-04-15 NOTE — ASSESSMENT & PLAN NOTE
Chronic, uncontrolled. Latest blood pressure and vitals reviewed-     Temp:  [98 °F (36.7 °C)]   Pulse:  []   Resp:  [18]   BP: (140-187)/()   SpO2:  [97 %-99 %] .   Home meds for hypertension were reviewed and noted below.   Hypertension Medications               lisinopriL (PRINIVIL,ZESTRIL) 20 MG tablet Take 20 mg by mouth once daily.            While in the hospital, will manage blood pressure as follows; Continue home antihypertensive regimen    Will utilize p.r.n. blood pressure medication only if patient's blood pressure greater than 180/110 and he develops symptoms such as worsening chest pain or shortness of breath.

## 2024-04-16 VITALS
OXYGEN SATURATION: 96 % | TEMPERATURE: 98 F | WEIGHT: 172.5 LBS | DIASTOLIC BLOOD PRESSURE: 80 MMHG | RESPIRATION RATE: 16 BRPM | SYSTOLIC BLOOD PRESSURE: 142 MMHG | BODY MASS INDEX: 24.15 KG/M2 | HEIGHT: 71 IN | HEART RATE: 73 BPM

## 2024-04-16 PROBLEM — N17.9 AKI (ACUTE KIDNEY INJURY): Status: RESOLVED | Noted: 2024-04-15 | Resolved: 2024-04-16

## 2024-04-16 LAB
ALBUMIN SERPL BCP-MCNC: 3.4 G/DL (ref 3.5–5.2)
ALP SERPL-CCNC: 108 U/L (ref 55–135)
ALT SERPL W/O P-5'-P-CCNC: 10 U/L (ref 10–44)
ANION GAP SERPL CALC-SCNC: 7 MMOL/L (ref 8–16)
AST SERPL-CCNC: 7 U/L (ref 10–40)
BASOPHILS # BLD AUTO: 0.02 K/UL (ref 0–0.2)
BASOPHILS NFR BLD: 0.3 % (ref 0–1.9)
BILIRUB SERPL-MCNC: 0.6 MG/DL (ref 0.1–1)
BUN SERPL-MCNC: 26 MG/DL (ref 8–23)
CALCIUM SERPL-MCNC: 8.5 MG/DL (ref 8.7–10.5)
CHLORIDE SERPL-SCNC: 107 MMOL/L (ref 95–110)
CO2 SERPL-SCNC: 22 MMOL/L (ref 23–29)
CREAT SERPL-MCNC: 1.7 MG/DL (ref 0.5–1.4)
DIFFERENTIAL METHOD BLD: ABNORMAL
EOSINOPHIL # BLD AUTO: 0.1 K/UL (ref 0–0.5)
EOSINOPHIL NFR BLD: 1 % (ref 0–8)
ERYTHROCYTE [DISTWIDTH] IN BLOOD BY AUTOMATED COUNT: 13.5 % (ref 11.5–14.5)
EST. GFR  (NO RACE VARIABLE): 42.6 ML/MIN/1.73 M^2
GLUCOSE SERPL-MCNC: 327 MG/DL (ref 70–110)
HCT VFR BLD AUTO: 38.2 % (ref 40–54)
HGB BLD-MCNC: 12.5 G/DL (ref 14–18)
IMM GRANULOCYTES # BLD AUTO: 0.01 K/UL (ref 0–0.04)
IMM GRANULOCYTES NFR BLD AUTO: 0.2 % (ref 0–0.5)
LYMPHOCYTES # BLD AUTO: 1.2 K/UL (ref 1–4.8)
LYMPHOCYTES NFR BLD: 19.1 % (ref 18–48)
MAGNESIUM SERPL-MCNC: 1.8 MG/DL (ref 1.6–2.6)
MCH RBC QN AUTO: 27.5 PG (ref 27–31)
MCHC RBC AUTO-ENTMCNC: 32.7 G/DL (ref 32–36)
MCV RBC AUTO: 84 FL (ref 82–98)
MONOCYTES # BLD AUTO: 0.5 K/UL (ref 0.3–1)
MONOCYTES NFR BLD: 7.8 % (ref 4–15)
NEUTROPHILS # BLD AUTO: 4.3 K/UL (ref 1.8–7.7)
NEUTROPHILS NFR BLD: 71.6 % (ref 38–73)
NRBC BLD-RTO: 0 /100 WBC
PLATELET # BLD AUTO: 170 K/UL (ref 150–450)
PMV BLD AUTO: 10.2 FL (ref 9.2–12.9)
POTASSIUM SERPL-SCNC: 3.6 MMOL/L (ref 3.5–5.1)
PROT SERPL-MCNC: 6.1 G/DL (ref 6–8.4)
RBC # BLD AUTO: 4.54 M/UL (ref 4.6–6.2)
SODIUM SERPL-SCNC: 136 MMOL/L (ref 136–145)
WBC # BLD AUTO: 6.02 K/UL (ref 3.9–12.7)

## 2024-04-16 PROCEDURE — G0378 HOSPITAL OBSERVATION PER HR: HCPCS

## 2024-04-16 PROCEDURE — 80053 COMPREHEN METABOLIC PANEL: CPT | Performed by: NURSE PRACTITIONER

## 2024-04-16 PROCEDURE — 96361 HYDRATE IV INFUSION ADD-ON: CPT

## 2024-04-16 PROCEDURE — 85025 COMPLETE CBC W/AUTO DIFF WBC: CPT | Performed by: NURSE PRACTITIONER

## 2024-04-16 PROCEDURE — 25000003 PHARM REV CODE 250: Performed by: NURSE PRACTITIONER

## 2024-04-16 PROCEDURE — 36415 COLL VENOUS BLD VENIPUNCTURE: CPT | Performed by: NURSE PRACTITIONER

## 2024-04-16 PROCEDURE — 83735 ASSAY OF MAGNESIUM: CPT | Performed by: NURSE PRACTITIONER

## 2024-04-16 PROCEDURE — 83036 HEMOGLOBIN GLYCOSYLATED A1C: CPT | Performed by: NURSE PRACTITIONER

## 2024-04-16 PROCEDURE — 25000003 PHARM REV CODE 250: Performed by: INTERNAL MEDICINE

## 2024-04-16 RX ORDER — IBUPROFEN 200 MG
24 TABLET ORAL
Status: DISCONTINUED | OUTPATIENT
Start: 2024-04-16 | End: 2024-04-16 | Stop reason: HOSPADM

## 2024-04-16 RX ORDER — IBUPROFEN 200 MG
16 TABLET ORAL
Status: DISCONTINUED | OUTPATIENT
Start: 2024-04-16 | End: 2024-04-16 | Stop reason: HOSPADM

## 2024-04-16 RX ORDER — GLUCAGON 1 MG
1 KIT INJECTION
Status: DISCONTINUED | OUTPATIENT
Start: 2024-04-16 | End: 2024-04-16 | Stop reason: HOSPADM

## 2024-04-16 RX ORDER — INSULIN ASPART 100 [IU]/ML
0-5 INJECTION, SOLUTION INTRAVENOUS; SUBCUTANEOUS
Status: DISCONTINUED | OUTPATIENT
Start: 2024-04-16 | End: 2024-04-16 | Stop reason: HOSPADM

## 2024-04-16 RX ORDER — TAMSULOSIN HYDROCHLORIDE 0.4 MG/1
0.4 CAPSULE ORAL DAILY
Qty: 30 CAPSULE | Refills: 11 | Status: SHIPPED | OUTPATIENT
Start: 2024-04-17 | End: 2025-04-17

## 2024-04-16 RX ADMIN — SODIUM CHLORIDE: 9 INJECTION, SOLUTION INTRAVENOUS at 05:04

## 2024-04-16 RX ADMIN — PANTOPRAZOLE SODIUM 40 MG: 40 TABLET, DELAYED RELEASE ORAL at 05:04

## 2024-04-16 RX ADMIN — TAMSULOSIN HYDROCHLORIDE 0.4 MG: 0.4 CAPSULE ORAL at 08:04

## 2024-04-16 NOTE — PLAN OF CARE
[facility]  Initial Discharge Assessment       Primary Care Provider: Diaz Shipley MD    Admission Diagnosis: Urinary retention [R33.9]    Admission Date: 4/15/2024  Expected Discharge Date: 4/16/2024    Transition of Care Barriers: None    Payor: "[employer] MGD MCARE [facility] / Plan: "[employer] CHOICES / Product Type: Medicare Advantage /     Extended Emergency Contact Information  Primary Emergency Contact: Jack Kenyon Jr  Address: [address] [place] [address] [facility]  Home Phone: [phone]  Mobile Phone: [phone]  Relation: Son    Discharge Plan A: Home  Discharge Plan B: Home with family      [facility] - [place] - [address]  [address] [facility] [address]  Phone: [phone] Fax: [phone]    SW met with patient at bedside to complete discharge planning assessment.  Patient alert and oriented xs 4.  Patient verified all demographic information on facesheet is correct.  Patient verified PCP is Dr. Shipley.  Patient verified primary health insurance is [email or web address].  Patient with NO home health or DME.  Patient with NO POA or Living Will.  Patient not on dialysis or medication coumadin.  Patient with no 30 day admission.  Patient with no financial issues at this time.  Patient family will provide transportation upon discharge from facility.  Patient independent with ADLs, live at [facility], family drives.      Initial Assessment (most recent)       Adult Discharge Assessment - 04/16/24 1030          Discharge Assessment    Assessment Type Discharge Planning Assessment     Confirmed/corrected address, phone number and insurance Yes     Confirmed Demographics Correct on Facesheet     Source of Information patient     Does patient/caregiver understand observation status Yes     Communicated TOBIAS with patient/caregiver Yes     People in Home facility resident     Facility Arrived  From: [facility]     Do you expect to return to your current living situation? Yes     Do you have help at home or someone to help you manage your care at home? Yes     Who are your caregiver(s) and their phone number(s)? staff     Prior to hospitilization cognitive status: Alert/Oriented     Current cognitive status: Alert/Oriented     Walking or Climbing Stairs Difficulty no     Dressing/Bathing Difficulty no     Equipment Currently Used at Home none     Readmission within 30 days? No     Patient currently being followed by outpatient case management? No     Do you currently have service(s) that help you manage your care at home? No     Do you take prescription medications? Yes     Do you have prescription coverage? Yes     Do you have any problems affording any of your prescribed medications? No     Is the patient taking medications as prescribed? yes     Who is going to help you get home at discharge? family     How do you get to doctors appointments? car, drives self     Are you on dialysis? No     Do you take coumadin? No     Discharge Plan A Home     Discharge Plan B Home with family     DME Needed Upon Discharge  none     Discharge Plan discussed with: Patient     Transition of Care Barriers None

## 2024-04-16 NOTE — DISCHARGE SUMMARY
[facility]  Discharge Summary      Patient Name: Jack Kenyon  MRN: [id number]  MIKE: [id number]  Patient Class: OP- Observation  Admission Date: 4/15/2024  Hospital Length of Stay: 0 days  Discharge Date and Time: 4/16/2024  3:07 PM  Attending Physician: No att. providers found   Discharging Provider: Anamaria Monaco MD  Primary Care Provider: Diaz Shipley MD    Primary Care Team: Networked reference to record PCT     HPI:   69-year-old male with a past medical history of chronic kidney disease, hypertension, renal cell carcinoma status post right nephrectomy presents to the emergency room reports of urinary retention that started at 7:00 p.m. last night.  He denies prostate problems.  He denies dysuria frequency.  Denies chest pain shortness for breath fevers weakness. he is followed by Dr. Jensen.  In the ER afebrile, no leukocytosis urinalysis negative for infection.  He does have JAYLON with a serum creatinine of 2.4 GFR of 24.  No old records to review baseline GFR.  Bladder scan revealed 900 mL of urine Ramon catheter placed 1000 mL of urine removed from bladder.  Retroperitoneal ultrasound performed no significant abnormalities noted to left kidney. Admit to  hospital medicine due to medical history with JAYLON.       * No surgery found *      Hospital Course:   Pt admitted for urinary retention. Had ramon placed with significant uop. Patient follows with urology and will keep ramon in till follow up with urology. Glucose noted to be elevated during admission. Patient denied hx of diabetes and reports having labs done outpatient that did not reveal elevated glucose. A1c pending. Patient would like to follow up with PCP for further management of elevated glucose and not start anything now. Renal function improving. Discharge home with ramon with follow up with urology and PCP.     Physical Exam  Vitals reviewed.   Constitutional:       Appearance: Normal appearance.    Cardiovascular:      Rate and Rhythm: Normal rate and regular rhythm.      Pulses: Normal pulses.   Pulmonary:      Effort: Pulmonary effort is normal. No respiratory distress.      Breath sounds: Normal breath sounds. No wheezing.      Goals of Care Treatment Preferences:  Code Status: Full Code      Consults:     No new Assessment & Plan notes have been filed under this hospital service since the last note was generated.  Service: Hospital Medicine    Final Active Diagnoses:    Diagnosis Date Noted POA    Urinary retention [R33.9] 04/15/2024 Yes    Hyperglycemia [R73.9] 04/15/2024 Yes    Essential hypertension [I10] 04/15/2024 Yes      Problems Resolved During this Admission:    Diagnosis Date Noted Date Resolved POA    PRINCIPAL PROBLEM:  JAYLON (acute kidney injury) [N17.9] 04/15/2024 04/16/2024 Yes       Discharged Condition: good    Disposition: Home or Self Care    Follow Up:   Follow-up Information       Pancho Jensen MD Follow up in 1 week(s).    Specialty: Urology  Why: message sent to Dr. Jensen's clinic to notify of discharge with ramon in place - clinic to contact you with hospital follow up appointment  Contact information:  [address]  [address]  [facility] [place] [facility] [address]  [phone]               Diaz Shipley MD. Go on 4/18/2024.    Specialty: Family Medicine  Why: hospital follow up apt scheduled at 9AM  Contact information:  [address] [place] [facility] [place] [address]  [phone]                           Patient Instructions:      Diet diabetic     Notify your health care provider if you experience any of the following:  temperature >100.4     Notify your health care provider if you experience any of the following:  severe uncontrolled pain     Notify your health care provider if you experience any of the following:  persistent nausea and vomiting or diarrhea     Notify your health care provider if you experience any of the following:  redness, tenderness, or signs of infection (pain,  swelling, redness, odor or green/yellow discharge around incision site)     Activity as tolerated       Significant Diagnostic Studies: Labs: CMP   Recent Labs   Lab 04/15/24  1110 04/16/24  0507    136   K 5.0 3.6    107   CO2 25 22*   * 327*   BUN 23 26*   CREATININE 2.4* 1.7*   CALCIUM 9.9 8.5*   PROT 7.8 6.1   ALBUMIN 4.3 3.4*   BILITOT 0.5 0.6   ALKPHOS 142* 108   AST 11 7*   ALT 13 10   ANIONGAP 8 7*    and CBC   Recent Labs   Lab 04/15/24  1110 04/16/24  0507   WBC 9.43 6.02   HGB 15.2 12.5*   HCT 46.8 38.2*    170       Pending Diagnostic Studies:       Procedure Component Value Units Date/Time    Hemoglobin A1c [[id number]] Collected: 04/16/24 0507    Order Status: Sent Lab Status: In process Updated: 04/16/24 0526    Specimen: Blood            Medications:  Reconciled Home Medications:      Medication List        START taking these medications      tamsulosin 0.4 mg Cap  Commonly known as: FLOMAX  Take 1 capsule (0.4 mg total) by mouth once daily.  Start taking on: April 17, 2024            CONTINUE taking these medications      lisinopriL 20 MG tablet  Commonly known as: PRINIVIL,ZESTRIL  Take 20 mg by mouth once daily.     TYLENOL EXTRA STRENGTH 500 MG tablet  Generic drug: acetaminophen  Take 500 mg by mouth every 6 (six) hours as needed for Pain.              Indwelling Lines/Drains at time of discharge:   Lines/Drains/Airways       Drain  Duration                  Urethral Catheter 04/15/24 1148 16 Fr. 1 day                    Time spent on the discharge of patient: 33 minutes         Anamaria Monaco MD  Department of Hospital Medicine  [facility]

## 2024-04-16 NOTE — PLAN OF CARE
PCP apt scheduled and added to AVS    Inbasket message sent to Dr. Jensen's clinic to notify of discharge on today with ramon - clinic to contact patient with apt information     04/16/24 1609   Post-Acute Status   Post-Acute Authorization Other   Other Status Awaiting f/u Appts   Hospital Resources/Appts/Education Provided Appointments scheduled and added to AVS

## 2024-04-16 NOTE — PLAN OF CARE
Problem: Infection  Goal: Absence of Infection Signs and Symptoms  Outcome: Met     Problem: Adult Inpatient Plan of Care  Goal: Plan of Care Review  Outcome: Met  Goal: Patient-Specific Goal (Individualized)  Outcome: Met  Goal: Absence of Hospital-Acquired Illness or Injury  Outcome: Met  Goal: Optimal Comfort and Wellbeing  Outcome: Met  Goal: Readiness for Transition of Care  Outcome: Met     Problem: Fluid and Electrolyte Imbalance (Acute Kidney Injury/Impairment)  Goal: Fluid and Electrolyte Balance  Outcome: Met     Problem: Oral Intake Inadequate (Acute Kidney Injury/Impairment)  Goal: Optimal Nutrition Intake  Outcome: Met     Problem: Renal Function Impairment (Acute Kidney Injury/Impairment)  Goal: Effective Renal Function  Outcome: Met

## 2024-04-16 NOTE — NURSING
Upon discharging patient, patient realized that he wallet was missing.  Patient stated he had it last in the ER last night and it must have fell out of his pocket.  Patient's son at the bedside.  Security notified to see if patient's wallet had been turned in and family requested to file a report with security.    Patient discharged back to [facility] with son by his side, all instructions reviewed with patient and patient verbalized understanding.  IV discontinued x 1, patient ambulated out of facility with family by his side.  All other belongings by patients side.  Pharmacy delivered meds to bedside.  Patient discharged home with ramon cath in place draining to gravity, yellow urine noted, no odor.  Patient educated on cath care and emptying ramon leg bag with return demonstration.

## 2024-04-16 NOTE — PLAN OF CARE
Discharge orders and chart reviewed. No other discharge needs noted at this time. Pt is clear for discharge from case management. Pt is discharging to home - [facility].    See previous note for follow up info    [facility] [place] notified of discharge back on today - per [facility], no need for assessment at this time     04/16/24 1053   Final Note   Assessment Type Final Discharge Note   Anticipated Discharge Disposition Home   What phone number can be called within the next 1-3 days to see how you are doing after discharge? [phone]   Hospital Resources/Appts/Education Provided Appointments scheduled and added to AVS   Post-Acute Status   Discharge Delays (!) Personal Transportation

## 2024-04-16 NOTE — PLAN OF CARE
04/16/24 1030   BAUTISTA Message   Medicare Outpatient and Observation Notification regarding financial responsibility Explained to patient/caregiver;Signed/date by patient/caregiver   Date BAUTISTA was signed 04/16/24   Time BAUTISTA was signed 1030

## 2024-04-16 NOTE — HOSPITAL COURSE
Pt admitted for urinary retention. Had ramon placed with significant uop. Patient follows with urology and will keep ramon in till follow up with urology. Glucose noted to be elevated during admission. Patient denied hx of diabetes and reports having labs done outpatient that did not reveal elevated glucose. A1c pending. Patient would like to follow up with PCP for further management of elevated glucose and not start anything now. Renal function improving. Discharge home with ramon with follow up with urology and PCP.     Physical Exam  Vitals reviewed.   Constitutional:       Appearance: Normal appearance.   Cardiovascular:      Rate and Rhythm: Normal rate and regular rhythm.      Pulses: Normal pulses.   Pulmonary:      Effort: Pulmonary effort is normal. No respiratory distress.      Breath sounds: Normal breath sounds. No wheezing.

## 2024-04-17 LAB
ESTIMATED AVG GLUCOSE: 346 MG/DL (ref 68–131)
HBA1C MFR BLD: 13.7 % (ref 4.5–6.2)

## 2024-04-19 ENCOUNTER — PATIENT OUTREACH (OUTPATIENT)
Dept: ADMINISTRATIVE | Facility: CLINIC | Age: 69
End: 2024-04-19

## 2024-04-22 NOTE — PROGRESS NOTES
2nd attempt to make [facility] Call. Left voicemail. Please call [phone] leave your first and last name and date of birth and ask for Sho. I will return your call as soon as possible.

## 2024-04-23 NOTE — PROGRESS NOTES
3rd attempt for [facility] Call; Left voicemail. Please call [phone] leave first and last name and  and ask for Sho. I will return your call as soon as possible.     3rd attempt to contact patient. Manually enrolled in Post Discharge Tracker.

## 2024-08-05 DIAGNOSIS — N40.0 BENIGN ENLARGEMENT OF PROSTATE: Primary | ICD-10-CM

## 2024-08-07 ENCOUNTER — HOSPITAL ENCOUNTER (OUTPATIENT)
Dept: RADIOLOGY | Facility: HOSPITAL | Age: 69
Discharge: HOME | End: 2024-08-07
Attending: SPECIALIST
Payer: MEDICARE

## 2024-08-07 DIAGNOSIS — N40.0 BENIGN ENLARGEMENT OF PROSTATE: ICD-10-CM

## 2024-08-07 PROCEDURE — 74450 X-RAY URETHRA/BLADDER: CPT | Mod: TC

## 2024-08-07 PROCEDURE — 51610 INJECTION FOR BLADDER X-RAY: CPT

## 2024-08-07 PROCEDURE — 25500020 PHARM REV CODE 255: Performed by: SPECIALIST

## 2024-08-07 RX ADMIN — IOHEXOL 100 ML: 350 INJECTION, SOLUTION INTRAVENOUS at 11:08

## 2025-04-08 DIAGNOSIS — N40.1 ENLARGED PROSTATE WITH URINARY OBSTRUCTION: Primary | ICD-10-CM

## 2025-04-08 DIAGNOSIS — C64.1 MALIGNANT NEOPLASM OF RIGHT KIDNEY, EXCEPT RENAL PELVIS: ICD-10-CM

## 2025-04-08 DIAGNOSIS — N13.8 ENLARGED PROSTATE WITH URINARY OBSTRUCTION: Primary | ICD-10-CM

## 2025-07-15 ENCOUNTER — HOSPITAL ENCOUNTER (OUTPATIENT)
Dept: RADIOLOGY | Facility: HOSPITAL | Age: 70
Discharge: HOME | End: 2025-07-15
Attending: SPECIALIST
Payer: MEDICARE

## 2025-07-15 DIAGNOSIS — N13.8 ENLARGED PROSTATE WITH URINARY OBSTRUCTION: ICD-10-CM

## 2025-07-15 DIAGNOSIS — N40.1 ENLARGED PROSTATE WITH URINARY OBSTRUCTION: ICD-10-CM

## 2025-07-15 DIAGNOSIS — C64.1 MALIGNANT NEOPLASM OF RIGHT KIDNEY, EXCEPT RENAL PELVIS: ICD-10-CM

## 2025-07-15 PROCEDURE — 76770 US EXAM ABDO BACK WALL COMP: CPT | Mod: TC,PO

## 2025-07-15 PROCEDURE — 76770 US EXAM ABDO BACK WALL COMP: CPT | Mod: 26,,, | Performed by: RADIOLOGY
